# Patient Record
Sex: FEMALE | Race: WHITE | NOT HISPANIC OR LATINO | URBAN - METROPOLITAN AREA
[De-identification: names, ages, dates, MRNs, and addresses within clinical notes are randomized per-mention and may not be internally consistent; named-entity substitution may affect disease eponyms.]

---

## 2020-01-10 ENCOUNTER — APPOINTMENT (OUTPATIENT)
Age: 55
Setting detail: DERMATOLOGY
End: 2020-01-10

## 2020-01-10 VITALS — RESPIRATION RATE: 18 BRPM | HEIGHT: 61 IN | WEIGHT: 181 LBS

## 2020-01-10 DIAGNOSIS — L30.9 DERMATITIS, UNSPECIFIED: ICD-10-CM

## 2020-01-10 PROCEDURE — OTHER MEDICATION COUNSELING: OTHER

## 2020-01-10 PROCEDURE — OTHER TREATMENT REGIMEN: OTHER

## 2020-01-10 PROCEDURE — OTHER PRESCRIPTION: OTHER

## 2020-01-10 PROCEDURE — 99202 OFFICE O/P NEW SF 15 MIN: CPT

## 2020-01-10 PROCEDURE — OTHER COUNSELING: OTHER

## 2020-01-10 PROCEDURE — OTHER MIPS QUALITY: OTHER

## 2020-01-10 RX ORDER — HYDROCORTISONE 25 MG/G
CREAM TOPICAL TWICE A DAY
Qty: 1 | Refills: 1 | Status: ERX | COMMUNITY
Start: 2020-01-10

## 2020-01-10 ASSESSMENT — LOCATION DETAILED DESCRIPTION DERM
LOCATION DETAILED: PREPUCE
LOCATION DETAILED: RIGHT LABIA MAJORA
LOCATION DETAILED: LEFT INGUINAL FOLD
LOCATION DETAILED: RIGHT INGUINAL FOLD

## 2020-01-10 ASSESSMENT — LOCATION ZONE DERM
LOCATION ZONE: VULVA
LOCATION ZONE: LEG

## 2020-01-10 ASSESSMENT — LOCATION SIMPLE DESCRIPTION DERM
LOCATION SIMPLE: LEFT INGUINAL FOLD
LOCATION SIMPLE: RIGHT INGUINAL FOLD
LOCATION SIMPLE: VULVA

## 2020-01-10 NOTE — HPI: RASH
What Type Of Note Output Would You Prefer (Optional)?: Bullet Format
How Severe Is Your Rash?: moderate
Is This A New Presentation, Or A Follow-Up?: Rash
Additional History: Patient states rash started 2 years ago after a procedure was done to check for bladder leaking and the surgical tape used peeled her skin off.

## 2020-01-10 NOTE — PROCEDURE: MEDICATION COUNSELING
Xelmelaniez Pregnancy And Lactation Text: This medication is Pregnancy Category D and is not considered safe during pregnancy.  The risk during breast feeding is also uncertain.

## 2020-01-10 NOTE — PROCEDURE: MEDICATION COUNSELING
Patient requested call back to discuss prescription for MetFORMIN ER (GLUMETZA) 1000 MG modified release 24 hr tablet. Patient stated that insurance company will not cover and has not been taking.    Please call to discuss and ok to leave detailed message.     Topical Sulfur Applications Pregnancy And Lactation Text: This medication is Pregnancy Category C and has an unknown safety profile during pregnancy. It is unknown if this topical medication is excreted in breast milk.

## 2020-01-10 NOTE — PROCEDURE: MEDICATION COUNSELING
Dr. Cormier Ivermectin Pregnancy And Lactation Text: This medication is Pregnancy Category C and it isn't known if it is safe during pregnancy. It is also excreted in breast milk.

## 2020-01-10 NOTE — PROCEDURE: TREATMENT REGIMEN
Detail Level: Zone
Otc Regimen: Moisturizing cream
Initiate Treatment: Hydrocortisone 2.5% cream, apply to affected areas of groin and vaginal area once a day x 1 week, stop x 1 week, repeat as needed
Plan: Patient states rash began after she was exposed to surgical tape in these areas. States that her primary care gave her a triamcinolone/nystatin combo that will temporarily clear the area, but it will recur with significant discomfort. Advised to treat with lower potency steroid on a more regular basis. \\nReturn to clinic in 6 weeks for re-evaluation.\\nIf controlled at follow-up, consider Protopic for maintenance. \\nMay consider biopsy if not improved.

## 2020-02-25 ENCOUNTER — APPOINTMENT (OUTPATIENT)
Age: 55
Setting detail: DERMATOLOGY
End: 2020-03-03

## 2020-02-25 VITALS — RESPIRATION RATE: 18 BRPM | HEIGHT: 61 IN | WEIGHT: 182 LBS

## 2020-02-25 DIAGNOSIS — L30.9 DERMATITIS, UNSPECIFIED: ICD-10-CM

## 2020-02-25 PROCEDURE — 99213 OFFICE O/P EST LOW 20 MIN: CPT

## 2020-02-25 PROCEDURE — OTHER COUNSELING: OTHER

## 2020-02-25 PROCEDURE — OTHER TREATMENT REGIMEN: OTHER

## 2020-02-25 ASSESSMENT — LOCATION ZONE DERM
LOCATION ZONE: VULVA
LOCATION ZONE: LEG

## 2020-02-25 ASSESSMENT — LOCATION DETAILED DESCRIPTION DERM
LOCATION DETAILED: RIGHT LABIA MAJORA
LOCATION DETAILED: LEFT INGUINAL FOLD
LOCATION DETAILED: PREPUCE
LOCATION DETAILED: RIGHT INGUINAL FOLD

## 2020-02-25 ASSESSMENT — LOCATION SIMPLE DESCRIPTION DERM
LOCATION SIMPLE: LEFT INGUINAL FOLD
LOCATION SIMPLE: VULVA
LOCATION SIMPLE: RIGHT INGUINAL FOLD

## 2020-02-25 NOTE — PROCEDURE: TREATMENT REGIMEN
Detail Level: Zone
Plan: Advised patient that I am concerned for possible steroid-dependence at this area. Area was initially treated with triamcinolone combo medication and has been treated off and on with topical steroids for two years. Advised patient that we will try a bland skin care routine and attempt to break the inflammatory cycle. Patient uses a pantiliner because elastic in her underwear irritates area; advised switch to silk underwear. \\nReturn to clinic in 3 months for re-evaluation.
Otc Regimen: EltaMD silver gel, apply to affected areas twice a day
Discontinue Regimen: Hydrocortisone cream

## 2020-05-28 ENCOUNTER — APPOINTMENT (OUTPATIENT)
Age: 55
Setting detail: DERMATOLOGY
End: 2020-05-28

## 2020-05-28 VITALS — WEIGHT: 62 LBS | TEMPERATURE: 98.7 F | HEIGHT: 72 IN | RESPIRATION RATE: 18 BRPM

## 2020-05-28 DIAGNOSIS — L30.9 DERMATITIS, UNSPECIFIED: ICD-10-CM

## 2020-05-28 PROCEDURE — OTHER BIOPSY BY PUNCH METHOD: OTHER

## 2020-05-28 PROCEDURE — 56605 BIOPSY OF VULVA/PERINEUM: CPT

## 2020-05-28 PROCEDURE — OTHER COUNSELING: OTHER

## 2020-05-28 ASSESSMENT — LOCATION DETAILED DESCRIPTION DERM: LOCATION DETAILED: MONS PUBIS

## 2020-05-28 ASSESSMENT — LOCATION ZONE DERM: LOCATION ZONE: VULVA

## 2020-05-28 ASSESSMENT — LOCATION SIMPLE DESCRIPTION DERM: LOCATION SIMPLE: GROIN

## 2020-05-28 NOTE — PROCEDURE: BIOPSY BY PUNCH METHOD
Anesthesia Volume In Cc (Will Not Render If 0): 0.5
Detail Level: Detailed
Bill For Surgical Tray: no
Suture Removal: 10 days
Additional Anesthesia Volume In Cc (Will Not Render If 0): 0
Dressing: bandage
Billing Type: Third-Party Bill
Hemostasis: None
Patient Will Remove Sutures At Home?: Yes
Epidermal Sutures: 4-0 Surgipro
Punch Size In Mm: 4
Biopsy Type: H and E
Anesthesia Type: 1% lidocaine with epinephrine
Information: Selecting Yes will display possible errors in your note based on the variables you have selected. This validation is only offered as a suggestion for you. PLEASE NOTE THAT THE VALIDATION TEXT WILL BE REMOVED WHEN YOU FINALIZE YOUR NOTE. IF YOU WANT TO FAX A PRELIMINARY NOTE YOU WILL NEED TO TOGGLE THIS TO 'NO' IF YOU DO NOT WANT IT IN YOUR FAXED NOTE.
Wound Care: Petrolatum

## 2020-05-28 NOTE — HPI: RASH
What Type Of Note Output Would You Prefer (Optional)?: Bullet Format
How Severe Is Your Rash?: moderate
Is This A New Presentation, Or A Follow-Up?: Follow Up Rash
Additional History: Patient has not used topical steroids since last visit. States rash is no better or worse.

## 2020-06-11 ENCOUNTER — RX ONLY (RX ONLY)
Age: 55
End: 2020-06-11

## 2020-06-11 RX ORDER — KETOCONAZOLE 20 MG/ML
SHAMPOO TOPICAL
Qty: 1 | Refills: 2 | Status: ERX | COMMUNITY
Start: 2020-06-11

## 2020-08-17 ENCOUNTER — APPOINTMENT (OUTPATIENT)
Age: 55
Setting detail: DERMATOLOGY
End: 2020-08-18

## 2020-08-17 VITALS — RESPIRATION RATE: 18 BRPM | HEIGHT: 61 IN | TEMPERATURE: 98.2 F | WEIGHT: 179 LBS

## 2020-08-17 DIAGNOSIS — L30.9 DERMATITIS, UNSPECIFIED: ICD-10-CM

## 2020-08-17 PROCEDURE — OTHER TREATMENT REGIMEN: OTHER

## 2020-08-17 PROCEDURE — OTHER COUNSELING: OTHER

## 2020-08-17 PROCEDURE — OTHER MEDICATION COUNSELING: OTHER

## 2020-08-17 PROCEDURE — OTHER PRESCRIPTION: OTHER

## 2020-08-17 PROCEDURE — 99213 OFFICE O/P EST LOW 20 MIN: CPT

## 2020-08-17 RX ORDER — HYDROCORTISONE 25 MG/G
CREAM TOPICAL TWICE A DAY
Qty: 1 | Refills: 3 | Status: ERX | COMMUNITY
Start: 2020-08-17

## 2020-08-17 RX ORDER — PIMECROLIMUS 10 MG/G
CREAM TOPICAL BID
Qty: 1 | Refills: 3 | Status: ERX | COMMUNITY
Start: 2020-08-17

## 2020-08-17 ASSESSMENT — LOCATION DETAILED DESCRIPTION DERM: LOCATION DETAILED: RIGHT SUPRAPUBIC SKIN

## 2020-08-17 ASSESSMENT — LOCATION SIMPLE DESCRIPTION DERM: LOCATION SIMPLE: GROIN

## 2020-08-17 ASSESSMENT — LOCATION ZONE DERM: LOCATION ZONE: TRUNK

## 2020-08-17 NOTE — PROCEDURE: MEDICATION COUNSELING
Infliximab Pregnancy And Lactation Text: This medication is Pregnancy Category B and is considered safe during pregnancy. It is unknown if this medication is excreted in breast milk. gradual onset

## 2020-08-17 NOTE — PROCEDURE: TREATMENT REGIMEN
Plan: Return to clinic in 6 weeks
Discontinue Regimen: Ketoconazole cream (patient states it burned)
Detail Level: Zone
Initiate Treatment: Elidel cream, apply to affected areas twice a day on off steroid week
Continue Regimen: Hydrocortisone 2.5% cream, apply to affected areas twice a day x 7 days, stop x 7 days, repeat

## 2020-09-28 ENCOUNTER — APPOINTMENT (OUTPATIENT)
Age: 55
Setting detail: DERMATOLOGY
End: 2020-09-28

## 2020-09-28 VITALS — WEIGHT: 183 LBS | HEIGHT: 61 IN | RESPIRATION RATE: 18 BRPM | TEMPERATURE: 98.6 F

## 2020-09-28 DIAGNOSIS — L23.1 ALLERGIC CONTACT DERMATITIS DUE TO ADHESIVES: ICD-10-CM

## 2020-09-28 PROCEDURE — OTHER TREATMENT REGIMEN: OTHER

## 2020-09-28 PROCEDURE — OTHER MEDICATION COUNSELING: OTHER

## 2020-09-28 PROCEDURE — OTHER PRESCRIPTION: OTHER

## 2020-09-28 PROCEDURE — 99212 OFFICE O/P EST SF 10 MIN: CPT

## 2020-09-28 PROCEDURE — OTHER COUNSELING: OTHER

## 2020-09-28 RX ORDER — HYDROCORTISONE 25 MG/G
CREAM TOPICAL TWICE A DAY
Qty: 1 | Refills: 3 | Status: CANCELLED
Stop reason: ENTERED-IN-ERROR

## 2020-09-28 RX ORDER — PIMECROLIMUS 10 MG/G
CREAM TOPICAL BID
Qty: 1 | Refills: 3 | Status: CANCELLED
Stop reason: ENTERED-IN-ERROR

## 2020-09-28 ASSESSMENT — LOCATION SIMPLE DESCRIPTION DERM: LOCATION SIMPLE: GENITALIA

## 2020-09-28 ASSESSMENT — LOCATION DETAILED DESCRIPTION DERM: LOCATION DETAILED: GENITALIA

## 2020-09-28 ASSESSMENT — LOCATION ZONE DERM: LOCATION ZONE: TRUNK

## 2020-09-28 NOTE — PROCEDURE: MEDICATION COUNSELING
former/No Cyclosporine Counseling:  I discussed with the patient the risks of cyclosporine including but not limited to hypertension, gingival hyperplasia,myelosuppression, immunosuppression, liver damage, kidney damage, neurotoxicity, lymphoma, and serious infections. The patient understands that monitoring is required including baseline blood pressure, CBC, CMP, lipid panel and uric acid, and then 1-2 times monthly CMP and blood pressure.

## 2020-09-28 NOTE — HPI: RASH
What Type Of Note Output Would You Prefer (Optional)?: Bullet Format
How Severe Is Your Rash?: moderate
Is This A New Presentation, Or A Follow-Up?: Follow Up Rash
Additional History: Patient states rash has been stable until she had to clean her pool 2 weeks ago, then started flaring again.

## 2020-09-28 NOTE — PROCEDURE: TREATMENT REGIMEN
Plan: Patient states elialan is helping improve symptoms. \\nReturn to clinic in 8 weeks.
Detail Level: Zone
Modify Regimen: Elidel cream, apply to affected areas twice a day for three weeks, stop for one week, start again\\nHydrocortisone 2.5% cream, apply to affected areas twice a day during elidel break week\\n

## 2020-11-09 ENCOUNTER — APPOINTMENT (OUTPATIENT)
Age: 55
Setting detail: DERMATOLOGY
End: 2020-11-10

## 2020-11-09 VITALS — TEMPERATURE: 98.1 F | WEIGHT: 180 LBS | HEIGHT: 61 IN | RESPIRATION RATE: 18 BRPM

## 2020-11-09 DIAGNOSIS — L23.89 ALLERGIC CONTACT DERMATITIS DUE TO OTHER AGENTS: ICD-10-CM

## 2020-11-09 PROCEDURE — OTHER MEDICATION COUNSELING: OTHER

## 2020-11-09 PROCEDURE — OTHER PRESCRIPTION MEDICATION MANAGEMENT: OTHER

## 2020-11-09 PROCEDURE — OTHER COUNSELING: OTHER

## 2020-11-09 PROCEDURE — 99213 OFFICE O/P EST LOW 20 MIN: CPT

## 2020-11-09 PROCEDURE — OTHER MIPS QUALITY: OTHER

## 2020-11-09 ASSESSMENT — LOCATION DETAILED DESCRIPTION DERM: LOCATION DETAILED: GENITALIA

## 2020-11-09 ASSESSMENT — LOCATION SIMPLE DESCRIPTION DERM: LOCATION SIMPLE: GENITALIA

## 2020-11-09 ASSESSMENT — LOCATION ZONE DERM: LOCATION ZONE: TRUNK

## 2020-11-09 NOTE — PROCEDURE: PRESCRIPTION MEDICATION MANAGEMENT
Detail Level: Zone
Render In Strict Bullet Format?: No
Continue Regimen: Elidel cream, apply to affected areas twice a day for three weeks, stop for one\\nweek, start again\\nHydrocortisone 2.5% cream, apply to affected areas twice a day during elidel break week
Plan: Return to clinic in 4 months\\n\\nPatient has had improvement. Symptoms can be aggravated by shaving, dampness, etc. Advised patient on lifestyle changes to minimize irritation. She states that her flares are improved with resumption of medication use. Advised patient that current level of control may be the best combination we can prescribe. Will continue to monitor. Plan to see every 4 months for observation. RTC sooner if flaring and not controlled with topicals.

## 2020-11-09 NOTE — HPI: RASH
What Type Of Note Output Would You Prefer (Optional)?: Bullet Format
How Severe Is Your Rash?: moderate
Is This A New Presentation, Or A Follow-Up?: Follow Up Rash
Additional History: at site of contact irritation from adhesive.

## 2021-03-09 ENCOUNTER — APPOINTMENT (OUTPATIENT)
Age: 56
Setting detail: DERMATOLOGY
End: 2021-03-10

## 2021-03-09 VITALS — RESPIRATION RATE: 18 BRPM | TEMPERATURE: 97.8 F | WEIGHT: 180 LBS | HEIGHT: 61 IN

## 2021-03-09 DIAGNOSIS — L23.89 ALLERGIC CONTACT DERMATITIS DUE TO OTHER AGENTS: ICD-10-CM

## 2021-03-09 PROCEDURE — OTHER PRESCRIPTION: OTHER

## 2021-03-09 PROCEDURE — OTHER PRESCRIPTION MEDICATION MANAGEMENT: OTHER

## 2021-03-09 PROCEDURE — OTHER MEDICATION COUNSELING: OTHER

## 2021-03-09 PROCEDURE — OTHER COUNSELING: OTHER

## 2021-03-09 PROCEDURE — 99213 OFFICE O/P EST LOW 20 MIN: CPT

## 2021-03-09 PROCEDURE — OTHER MIPS QUALITY: OTHER

## 2021-03-09 RX ORDER — CLOBETASOL PROPIONATE 0.5 MG/G
CREAM TOPICAL BID
Qty: 1 | Refills: 3 | Status: ERX | COMMUNITY
Start: 2021-03-09

## 2021-03-09 ASSESSMENT — LOCATION DETAILED DESCRIPTION DERM: LOCATION DETAILED: GENITALIA

## 2021-03-09 ASSESSMENT — LOCATION SIMPLE DESCRIPTION DERM: LOCATION SIMPLE: GENITALIA

## 2021-03-09 ASSESSMENT — LOCATION ZONE DERM: LOCATION ZONE: TRUNK

## 2021-04-20 ENCOUNTER — APPOINTMENT (OUTPATIENT)
Age: 56
Setting detail: DERMATOLOGY
End: 2021-04-20

## 2021-04-20 VITALS — HEIGHT: 61 IN | RESPIRATION RATE: 18 BRPM | TEMPERATURE: 98.4 F | WEIGHT: 189 LBS

## 2021-04-20 DIAGNOSIS — L23.89 ALLERGIC CONTACT DERMATITIS DUE TO OTHER AGENTS: ICD-10-CM

## 2021-04-20 PROCEDURE — OTHER COUNSELING: OTHER

## 2021-04-20 PROCEDURE — 99213 OFFICE O/P EST LOW 20 MIN: CPT

## 2021-04-20 PROCEDURE — OTHER MEDICATION COUNSELING: OTHER

## 2021-04-20 PROCEDURE — OTHER PRESCRIPTION MEDICATION MANAGEMENT: OTHER

## 2021-04-20 ASSESSMENT — LOCATION DETAILED DESCRIPTION DERM: LOCATION DETAILED: GENITALIA

## 2021-04-20 ASSESSMENT — LOCATION SIMPLE DESCRIPTION DERM: LOCATION SIMPLE: GENITALIA

## 2021-04-20 ASSESSMENT — LOCATION ZONE DERM: LOCATION ZONE: TRUNK

## 2021-04-20 NOTE — PROCEDURE: PRESCRIPTION MEDICATION MANAGEMENT
Detail Level: Zone
Discontinue Regimen: clobetasol 0.05 % topical cream, Apply twice daily to rash x3 days, repeat every other week. (Will use for moderate/severe flares)
Render In Strict Bullet Format?: No
Continue Regimen: Elidel cream, apply to affected areas twice a day for three weeks, stop for one week, start again\\nHydrocortisone 2.5% cream, apply to affected areas twice a day during elidel break week
Plan: Patient advised to try kegel exercises to help improve bladder control. She states that she is continually damp due to bladder leakage. I advised that rash will probably continue to flare if the area remains damp. Using high potency steroid for short period as flare control.  \\nReturn to clinic in 3 months for a follow up visit

## 2021-07-20 ENCOUNTER — APPOINTMENT (OUTPATIENT)
Dept: URBAN - METROPOLITAN AREA CLINIC 265 | Age: 56
Setting detail: DERMATOLOGY
End: 2021-07-20

## 2021-07-20 VITALS — WEIGHT: 180 LBS | RESPIRATION RATE: 18 BRPM | HEIGHT: 61 IN

## 2021-07-20 DIAGNOSIS — D18.0 HEMANGIOMA: ICD-10-CM

## 2021-07-20 DIAGNOSIS — D22 MELANOCYTIC NEVI: ICD-10-CM

## 2021-07-20 DIAGNOSIS — L81.4 OTHER MELANIN HYPERPIGMENTATION: ICD-10-CM

## 2021-07-20 DIAGNOSIS — D485 NEOPLASM OF UNCERTAIN BEHAVIOR OF SKIN: ICD-10-CM

## 2021-07-20 DIAGNOSIS — L23.89 ALLERGIC CONTACT DERMATITIS DUE TO OTHER AGENTS: ICD-10-CM

## 2021-07-20 DIAGNOSIS — L82.1 OTHER SEBORRHEIC KERATOSIS: ICD-10-CM

## 2021-07-20 DIAGNOSIS — Z71.89 OTHER SPECIFIED COUNSELING: ICD-10-CM

## 2021-07-20 PROBLEM — D22.5 MELANOCYTIC NEVI OF TRUNK: Status: ACTIVE | Noted: 2021-07-20

## 2021-07-20 PROBLEM — D48.5 NEOPLASM OF UNCERTAIN BEHAVIOR OF SKIN: Status: ACTIVE | Noted: 2021-07-20

## 2021-07-20 PROBLEM — D18.01 HEMANGIOMA OF SKIN AND SUBCUTANEOUS TISSUE: Status: ACTIVE | Noted: 2021-07-20

## 2021-07-20 PROCEDURE — 11102 TANGNTL BX SKIN SINGLE LES: CPT

## 2021-07-20 PROCEDURE — 11103 TANGNTL BX SKIN EA SEP/ADDL: CPT

## 2021-07-20 PROCEDURE — OTHER BIOPSY BY SHAVE METHOD: OTHER

## 2021-07-20 PROCEDURE — OTHER MEDICATION COUNSELING: OTHER

## 2021-07-20 PROCEDURE — OTHER COUNSELING: OTHER

## 2021-07-20 PROCEDURE — 99214 OFFICE O/P EST MOD 30 MIN: CPT | Mod: 25

## 2021-07-20 PROCEDURE — OTHER PRESCRIPTION MEDICATION MANAGEMENT: OTHER

## 2021-07-20 ASSESSMENT — LOCATION DETAILED DESCRIPTION DERM
LOCATION DETAILED: GENITALIA
LOCATION DETAILED: SUPERIOR LUMBAR SPINE
LOCATION DETAILED: PERIUMBILICAL SKIN
LOCATION DETAILED: EPIGASTRIC SKIN
LOCATION DETAILED: RIGHT KNEE
LOCATION DETAILED: LEFT ANTERIOR PROXIMAL THIGH
LOCATION DETAILED: INFERIOR THORACIC SPINE
LOCATION DETAILED: RIGHT LATERAL SUPERIOR CHEST

## 2021-07-20 ASSESSMENT — LOCATION SIMPLE DESCRIPTION DERM
LOCATION SIMPLE: CHEST
LOCATION SIMPLE: GENITALIA
LOCATION SIMPLE: LEFT THIGH
LOCATION SIMPLE: UPPER BACK
LOCATION SIMPLE: RIGHT KNEE
LOCATION SIMPLE: ABDOMEN
LOCATION SIMPLE: LOWER BACK

## 2021-07-20 ASSESSMENT — LOCATION ZONE DERM
LOCATION ZONE: LEG
LOCATION ZONE: TRUNK

## 2021-07-20 NOTE — PROCEDURE: MEDICATION COUNSELING
19-Oct-2018 14:00 Rituxan Pregnancy And Lactation Text: This medication is Pregnancy Category C and it isn't know if it is safe during pregnancy. It is unknown if this medication is excreted in breast milk but similar antibodies are known to be excreted.

## 2021-07-20 NOTE — PROCEDURE: PRESCRIPTION MEDICATION MANAGEMENT
Render In Strict Bullet Format?: No
Continue Regimen: Elidel cream, apply to affected areas twice a day for three weeks, stop for one week, start again\\nHydrocortisone 2.5% cream, apply to affected areas twice a day during elidel break week
Plan: Patient experienced recent flare when she got in a swimming pool. She has resumed treatment and is noting improvement. Discussed avoiding exposures. Continue treatment as prescribed.
Detail Level: Zone

## 2021-07-20 NOTE — PROCEDURE: BIOPSY BY SHAVE METHOD
Hide Second Anesthesia?: No
X Size Of Lesion In Cm: 0
Wound Care: Petrolatum
Type Of Destruction Used: Curettage
Cryotherapy Text: The wound bed was treated with cryotherapy after the biopsy was performed.
Notification Instructions: Patient will be notified of biopsy results. However, patient instructed to call the office if not contacted within 2 weeks.
Electrodesiccation Text: The wound bed was treated with electrodesiccation after the biopsy was performed.
Anesthesia Volume In Cc (Will Not Render If 0): 0.5
Anesthesia Type: 1% lidocaine with epinephrine
Depth Of Biopsy: dermis
Consent: Written consent was obtained and risks were reviewed including but not limited to scarring, infection, bleeding, scabbing, incomplete removal, nerve damage and allergy to anesthesia.
Curettage Text: The wound bed was treated with curettage after the biopsy was performed.
Detail Level: Detailed
Post-Care Instructions: I reviewed with the patient in detail post-care instructions. Patient is to keep the biopsy site dry overnight, and then apply bacitracin twice daily until healed. Patient may apply hydrogen peroxide soaks to remove any crusting.
Hemostasis: Drysol
Dressing: bandage
Biopsy Method: Dermablade
Silver Nitrate Text: The wound bed was treated with silver nitrate after the biopsy was performed.
Billing Type: Third-Party Bill
Information: Selecting Yes will display possible errors in your note based on the variables you have selected. This validation is only offered as a suggestion for you. PLEASE NOTE THAT THE VALIDATION TEXT WILL BE REMOVED WHEN YOU FINALIZE YOUR NOTE. IF YOU WANT TO FAX A PRELIMINARY NOTE YOU WILL NEED TO TOGGLE THIS TO 'NO' IF YOU DO NOT WANT IT IN YOUR FAXED NOTE.
Electrodesiccation And Curettage Text: The wound bed was treated with electrodesiccation and curettage after the biopsy was performed.
Biopsy Type: H and E
Was A Bandage Applied: Yes

## 2021-08-18 NOTE — PROCEDURE: MEDICATION COUNSELING
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  It is also excreted in breast milk but can be used safely for shorter doses.

## 2021-11-22 ENCOUNTER — APPOINTMENT (OUTPATIENT)
Dept: URBAN - METROPOLITAN AREA CLINIC 265 | Age: 56
Setting detail: DERMATOLOGY
End: 2021-11-22

## 2021-11-22 VITALS — WEIGHT: 180 LBS | RESPIRATION RATE: 18 BRPM | HEIGHT: 61 IN

## 2021-11-22 DIAGNOSIS — L23.89 ALLERGIC CONTACT DERMATITIS DUE TO OTHER AGENTS: ICD-10-CM

## 2021-11-22 PROCEDURE — OTHER MIPS QUALITY: OTHER

## 2021-11-22 PROCEDURE — OTHER PRESCRIPTION MEDICATION MANAGEMENT: OTHER

## 2021-11-22 PROCEDURE — 99213 OFFICE O/P EST LOW 20 MIN: CPT

## 2021-11-22 PROCEDURE — OTHER MEDICATION COUNSELING: OTHER

## 2021-11-22 PROCEDURE — OTHER COUNSELING: OTHER

## 2021-11-22 ASSESSMENT — LOCATION ZONE DERM: LOCATION ZONE: TRUNK

## 2021-11-22 ASSESSMENT — LOCATION SIMPLE DESCRIPTION DERM: LOCATION SIMPLE: GENITALIA

## 2021-11-22 ASSESSMENT — LOCATION DETAILED DESCRIPTION DERM: LOCATION DETAILED: GENITALIA

## 2021-11-22 NOTE — PROCEDURE: MIPS QUALITY
Detail Level: Detailed
Quality 130: Documentation Of Current Medications In The Medical Record: Current Medications Documented
Quality 110: Preventive Care And Screening: Influenza Immunization: Influenza Immunization previously received during influenza season
Quality 226: Preventive Care And Screening: Tobacco Use: Screening And Cessation Intervention: Tobacco Screening not Performed for Unknown Reasons
Quality 431: Preventive Care And Screening: Unhealthy Alcohol Use - Screening: Patient identified as an unhealthy alcohol user when screened for unhealthy alcohol use using a systematic screening method and received brief counseling

## 2021-11-22 NOTE — PROCEDURE: MEDICATION COUNSELING
3 = unable to speak (not related to language barrier) Topical Clindamycin Counseling: Patient counseled that this medication may cause skin irritation or allergic reactions.  In the event of skin irritation, the patient was advised to reduce the amount of the drug applied or use it less frequently.   The patient verbalized understanding of the proper use and possible adverse effects of clindamycin.  All of the patient's questions and concerns were addressed.

## 2021-11-22 NOTE — PROCEDURE: PRESCRIPTION MEDICATION MANAGEMENT
Plan: Return to clinic in 3 months for a follow up visit
Detail Level: Zone
Render In Strict Bullet Format?: No
Continue Regimen: Elidel cream, apply to affected areas twice a day during steroid off week. \\nHydrocortisone 2.5% cream, apply to affected areas twice a day x1 week

## 2021-11-22 NOTE — PROCEDURE: MEDICATION COUNSELING
Vaccine Information Statement(s) was given today. This has been reviewed, questions answered, and verbal consent given by Patient for injection(s) and administration of Influenza (Inactivated).    1. Does the patient have a moderate to severe fever?  No  2. Has the patient had a serious reaction to a flu shot before?   No  3. Has the patient ever had Guillian [place] Syndrome within 6 weeks of a previous flu shot?  No  4. Does the patient have a serious allergy to eggs?  No  5. Is the patient less that 6 months of age?  No    Patient is eligible to receive the vaccine based on all questions being answered as 'No'.        Patient tolerated without incident. See immunization grid for documentation.     Quinolones Pregnancy And Lactation Text: This medication is Pregnancy Category C and it isn't know if it is safe during pregnancy. It is also excreted in breast milk.

## 2022-02-22 ENCOUNTER — APPOINTMENT (OUTPATIENT)
Dept: URBAN - METROPOLITAN AREA CLINIC 265 | Age: 57
Setting detail: DERMATOLOGY
End: 2022-02-22

## 2022-02-22 VITALS — HEIGHT: 61 IN | WEIGHT: 180 LBS | RESPIRATION RATE: 18 BRPM

## 2022-02-22 DIAGNOSIS — L23.89 ALLERGIC CONTACT DERMATITIS DUE TO OTHER AGENTS: ICD-10-CM

## 2022-02-22 PROCEDURE — OTHER PRESCRIPTION: OTHER

## 2022-02-22 PROCEDURE — OTHER PRESCRIPTION MEDICATION MANAGEMENT: OTHER

## 2022-02-22 PROCEDURE — OTHER MEDICATION COUNSELING: OTHER

## 2022-02-22 PROCEDURE — OTHER MIPS QUALITY: OTHER

## 2022-02-22 PROCEDURE — 99213 OFFICE O/P EST LOW 20 MIN: CPT

## 2022-02-22 PROCEDURE — OTHER COUNSELING: OTHER

## 2022-02-22 RX ORDER — HYDROCORTISONE 25 MG/G
CREAM TOPICAL
Qty: 30 | Refills: 3 | Status: ERX | COMMUNITY
Start: 2022-02-22

## 2022-02-22 RX ORDER — PIMECROLIMUS 10 MG/G
CREAM TOPICAL
Qty: 100 | Refills: 3 | Status: ERX | COMMUNITY
Start: 2022-02-22

## 2022-02-22 ASSESSMENT — LOCATION SIMPLE DESCRIPTION DERM: LOCATION SIMPLE: GENITALIA

## 2022-02-22 ASSESSMENT — LOCATION DETAILED DESCRIPTION DERM: LOCATION DETAILED: GENITALIA

## 2022-02-22 ASSESSMENT — LOCATION ZONE DERM: LOCATION ZONE: TRUNK

## 2022-02-22 NOTE — PROCEDURE: MIPS QUALITY
Detail Level: Detailed
Quality 226: Preventive Care And Screening: Tobacco Use: Screening And Cessation Intervention: Tobacco Screening not Performed for Unknown Reasons
Quality 130: Documentation Of Current Medications In The Medical Record: Current Medications Documented
Quality 110: Preventive Care And Screening: Influenza Immunization: Influenza Immunization previously received during influenza season
Quality 431: Preventive Care And Screening: Unhealthy Alcohol Use - Screening: Patient identified as an unhealthy alcohol user when screened for unhealthy alcohol use using a systematic screening method and received brief counseling

## 2022-02-22 NOTE — PROCEDURE: PRESCRIPTION MEDICATION MANAGEMENT
Continue Regimen: Elidel cream, apply to affected areas twice a day during steroid off week. \\nHydrocortisone 2.5% cream, apply to affected areas twice a day x1 week
Plan: Return to clinic in  months for a follow up visit\\nPatient is significantly improved
Detail Level: Zone
Render In Strict Bullet Format?: No

## 2022-08-22 ENCOUNTER — APPOINTMENT (OUTPATIENT)
Dept: URBAN - METROPOLITAN AREA CLINIC 265 | Age: 57
Setting detail: DERMATOLOGY
End: 2022-08-22

## 2022-08-22 VITALS — RESPIRATION RATE: 16 BRPM | HEIGHT: 61 IN | WEIGHT: 180 LBS

## 2022-08-22 DIAGNOSIS — L23.1 ALLERGIC CONTACT DERMATITIS DUE TO ADHESIVES: ICD-10-CM

## 2022-08-22 PROCEDURE — OTHER COUNSELING: OTHER

## 2022-08-22 PROCEDURE — OTHER MIPS QUALITY: OTHER

## 2022-08-22 PROCEDURE — 99213 OFFICE O/P EST LOW 20 MIN: CPT

## 2022-08-22 PROCEDURE — OTHER PRESCRIPTION MEDICATION MANAGEMENT: OTHER

## 2022-08-22 ASSESSMENT — LOCATION DETAILED DESCRIPTION DERM: LOCATION DETAILED: GENITALIA

## 2022-08-22 ASSESSMENT — LOCATION SIMPLE DESCRIPTION DERM: LOCATION SIMPLE: GENITALIA

## 2022-08-22 ASSESSMENT — LOCATION ZONE DERM: LOCATION ZONE: TRUNK

## 2022-08-22 NOTE — PROCEDURE: PRESCRIPTION MEDICATION MANAGEMENT
Initiate Treatment: Cortisone 10 OTC,  in in place of hydrocortisone 2.5%
Plan: Return to clinic in 3 months for follow up
Discontinue Regimen: Hydrocortisone 2.5% topical (patient felt like it was thinning the skin in the area; discussed minimizing use)
Modify Regimen: Elidel cream, use BID x 2 weeks, then use cortisone 10
Detail Level: Simple
Render In Strict Bullet Format?: No

## 2022-12-12 ENCOUNTER — APPOINTMENT (OUTPATIENT)
Dept: URBAN - METROPOLITAN AREA CLINIC 265 | Age: 57
Setting detail: DERMATOLOGY
End: 2022-12-12

## 2022-12-12 VITALS — HEIGHT: 61 IN | WEIGHT: 180 LBS | RESPIRATION RATE: 18 BRPM

## 2022-12-12 DIAGNOSIS — L23.1 ALLERGIC CONTACT DERMATITIS DUE TO ADHESIVES: ICD-10-CM

## 2022-12-12 PROCEDURE — OTHER PRESCRIPTION MEDICATION MANAGEMENT: OTHER

## 2022-12-12 PROCEDURE — OTHER MIPS QUALITY: OTHER

## 2022-12-12 PROCEDURE — 99213 OFFICE O/P EST LOW 20 MIN: CPT

## 2022-12-12 PROCEDURE — OTHER COUNSELING: OTHER

## 2022-12-12 ASSESSMENT — LOCATION DETAILED DESCRIPTION DERM: LOCATION DETAILED: GENITALIA

## 2022-12-12 ASSESSMENT — LOCATION SIMPLE DESCRIPTION DERM: LOCATION SIMPLE: GENITALIA

## 2022-12-12 ASSESSMENT — LOCATION ZONE DERM: LOCATION ZONE: TRUNK

## 2022-12-12 NOTE — PROCEDURE: PRESCRIPTION MEDICATION MANAGEMENT
Render In Strict Bullet Format?: No
Continue Regimen: Cortisone 10 OTC, BID x 1 week \\nElidel cream, use BID x 3 weeks\\n\\nrepeat cycle
Plan: Return to clinic in 6 months for follow up\\n\\npatient has been controlled on daily elidel. advised to take a week break monthly to avoid acquiring a tolerance.
Detail Level: Simple

## 2022-12-12 NOTE — HPI: RASH (ALLERGIC CONTACT DERMATITIS)
Group Therapy Documentation    PATIENT'S NAME: Flower Logan  MRN:   [id number]  :   1991  ACCT. NUMBER: [id number]  DATE OF SERVICE: 20  START TIME:  3:00 PM  END TIME:  4:00 PM  FACILITATOR(S): Fe Connell LADC; Kaleb Ross LADC; Dimple Rivas LADC  TOPIC: BEH Group Therapy  Number of patients attending the group: 20  Group Length:  1 Hours    Group Therapy Type: Recovery strategies    Summary of Group / Topics Discussed:    Sober coping skills, Co-occurring illnesses symptom management, and Disease of addiction      Group Attendance:  Attended group session    Patient's response to the group topic/interactions:  cooperative with task    Patient appeared to be Attentive and Engaged.        Client specific details: Flower was an active participant in Skills Group led by gambling counselor Whit Sellers.    
How Severe Is Your Rash?: moderate
Is This A New Presentation, Or A Follow-Up?: Follow Up Allergic Contact Dermatitis

## 2023-03-18 NOTE — PROCEDURE: MIPS QUALITY
Gastroenterology Consultation Note    Reason for Consult:  The primary encounter diagnosis was Shortness of breath. Diagnoses of Abdominal pain, Lower GI bleeding, and Anticoagulated were also pertinent to this visit.    PCP:   Jamaal Whitney MD    Referring MD:  Jamaal Whitney Md  [address] [place] [address]    Hospital Day: 0     Initial History of Present Illness (HPI):  This is a 64 y.o. female, with no prior GI workup, present ed to the ED after becoming constipated and straining to use the restroom. Pt has had very large hemorrhoids for years and they often become irritated with her straining. Pt is on chronic pain medication that causes her constipation. Pt also with past medical history of paroxysmal atrial fibrillation on Eliquis, history of DVT/PE, Goodpasture syndrome and COPD on chronic home oxygen, chronic kidney disease. Pt with no episodes of bleeding since admit.    No prior colonoscopy    ROS:  Review of Systems   Constitutional:  Negative for activity change, chills, diaphoresis, fatigue and fever.   HENT:  Negative for congestion, ear pain, sinus pain and sore throat.    Eyes:  Negative for visual disturbance.   Respiratory:  Negative for cough, shortness of breath, wheezing and stridor.    Cardiovascular:  Negative for chest pain, palpitations and leg swelling.   Gastrointestinal:  Positive for abdominal pain, anal bleeding and hematochezia. Negative for constipation, diarrhea, nausea, rectal pain and vomiting.   Genitourinary:  Negative for dysuria and hematuria.   Musculoskeletal:  Negative for arthralgias, back pain and myalgias.   Skin:  Negative for rash.   Neurological:  Negative for dizziness, syncope, weakness, numbness and headaches.   All other systems reviewed and are negative.    Medical History:   Past Medical History:   Diagnosis Date    COPD (chronic obstructive pulmonary disease)     Paroxysmal atrial fibrillation     Tremor        Surgical History: 
  No past surgical history on file.    Family History:   Family History   Problem Relation Age of Onset    Stroke Mother     COPD Mother     Lung cancer Father    .     Social History:   Social History     Tobacco Use    Smoking status: Former     Types: Cigarettes     Quit date: 2020     Years since quitting: 3.2     Passive exposure: Current    Smokeless tobacco: Never   Substance Use Topics    Alcohol use: Never       Allergies:  Review of patient's allergies indicates:  No Known Allergies    Medications Prior to Admission   Medication Sig Dispense Refill Last Dose    acetaZOLAMIDE (DIAMOX) 250 MG tablet Take by mouth daily as needed.   3/16/2023    albuterol (PROVENTIL/VENTOLIN HFA) 90 mcg/actuation inhaler Inhale 2 puffs into the lungs every 6 (six) hours as needed.   3/16/2023    albuterol-ipratropium (DUO-NEB) 2.5 mg-0.5 mg/3 mL nebulizer solution Inhale 3 mLs into the lungs every 6 (six) hours as needed.   3/16/2023    ALPRAZolam (XANAX) 2 MG Tab Take 2 mg by mouth 2 (two) times daily.   3/16/2023    amLODIPine (NORVASC) 5 MG tablet Take 1 tablet by mouth once daily.       cyanocobalamin 1,000 mcg/mL injection every 14 (fourteen) days.   3/16/2023    FLUoxetine 40 MG capsule Take 80 mg by mouth.   3/16/2023    folic acid (FOLVITE) 1 MG tablet Take 1,000 mcg by mouth once daily.   3/16/2023    furosemide (LASIX) 20 MG tablet Take 20 mg by mouth daily as needed.   Past Week    HYDROcodone-acetaminophen (NORCO)  mg per tablet Take 1 tablet by mouth 2 (two) times daily as needed.   3/16/2023    mirtazapine (REMERON) 30 MG tablet Take 30 mg by mouth every evening.   3/16/2023    oxybutynin (DITROPAN) 5 MG Tab Take 5 mg by mouth once daily.   3/16/2023    pantoprazole (PROTONIX) 40 MG tablet Take 40 mg by mouth once daily.   3/16/2023    primidone (MYSOLINE) 50 MG Tab Take 1 tablet (50 mg total) by mouth every evening. 30 tablet 11 3/16/2023    rivaroxaban (XARELTO) 20 mg Tab Take 1 tablet (20 mg total) by 
"mouth daily with dinner or evening meal. 30 tablet 0          Objective Findings:    Vital Signs:  BP (!) 153/76   Pulse 81   Temp 97.8 °F (36.6 °C) (Oral)   Resp 20   Ht 5' 1" (1.549 m)   Wt 88 kg (194 lb)   SpO2 (!) 94%   Breastfeeding No   BMI 36.66 kg/m²   Body mass index is 36.66 kg/m².    Physical Exam:     Nursing note and vitals reviewed.  Constitutional: No distress.   HENT:   Head: Normocephalic and atraumatic.   Eyes: EOM are normal.   Neck: Trachea normal. Neck supple.   Normal range of motion.  Cardiovascular:  Regular rhythm.           No murmur heard.  Mild tachycardia   Pulmonary/Chest: Breath sounds normal. No respiratory distress. She has no wheezes. She has no rales.   Abdominal: Abdomen is soft. Bowel sounds are normal. She exhibits no distension. There is abdominal tenderness.   Mild generalized abdominal tenderness to palpation. Ventral wall hernia present to LLQ.  There is no rebound and no guarding.   Genitourinary:    Genitourinary Comments:    Musculoskeletal:         General: Normal range of motion.      Cervical back: Normal range of motion and neck supple.      Lumbar back: Normal range of motion.    Neurological: She is alert and oriented to person, place, and time. She has normal strength.   Skin: Skin is warm and dry. No rash noted.   Psychiatric: She has a normal mood and affect.     Labs:  Recent Results (from the past 48 hour(s))   Comprehensive metabolic panel    Collection Time: 03/17/23  4:45 PM   Result Value Ref Range    Sodium Level 138 136 - 145 mmol/L    Potassium Level 5.4 (H) 3.5 - 5.1 mmol/L    Chloride 105 98 - 107 mmol/L    Carbon Dioxide 25 23 - 31 mmol/L    Glucose Level 92 82 - 115 mg/dL    Blood Urea Nitrogen 18.0 9.8 - 20.1 mg/dL    Creatinine 1.22 (H) 0.55 - 1.02 mg/dL    Calcium Level Total 9.8 8.4 - 10.2 mg/dL    Protein Total 7.5 5.8 - 7.6 gm/dL    Albumin Level 3.8 3.4 - 4.8 g/dL    Globulin 3.7 (H) 2.4 - 3.5 gm/dL    Albumin/Globulin Ratio 1.0 (L) "
1.1 - 2.0 ratio    Bilirubin Total 0.3 <=1.5 mg/dL    Alkaline Phosphatase 143 40 - 150 unit/L    Alanine Aminotransferase 30 0 - 55 unit/L    Aspartate Aminotransferase 30 5 - 34 unit/L    eGFR 50 mls/min/1.73/m2   APTT    Collection Time: 03/17/23  4:45 PM   Result Value Ref Range    PTT 25.8 23.2 - 33.7 seconds   Protime-INR    Collection Time: 03/17/23  4:45 PM   Result Value Ref Range    PT 12.7 12.5 - 14.5 seconds    INR 0.96 0.00 - 1.30   CBC with Differential    Collection Time: 03/17/23  4:45 PM   Result Value Ref Range    WBC 12.9 (H) 4.5 - 11.5 x10(3)/mcL    RBC 4.94 4.20 - 5.40 x10(6)/mcL    Hgb 14.1 12.0 - 16.0 g/dL    Hct 46.0 37.0 - 47.0 %    MCV 93.1 80.0 - 94.0 fL    MCH 28.5 pg    MCHC 30.7 (L) 33.0 - 36.0 g/dL    RDW 16.3 11.5 - 17.0 %    Platelet 290 130 - 400 x10(3)/mcL    MPV 10.4 7.4 - 10.4 fL    Neut % 60.9 %    Lymph % 27.0 %    Mono % 5.2 %    Eos % 4.0 %    Basophil % 1.0 %    Lymph # 3.47 0.6 - 4.6 x10(3)/mcL    Neut # 7.83 2.1 - 9.2 x10(3)/mcL    Mono # 0.67 0.1 - 1.3 x10(3)/mcL    Eos # 0.52 0 - 0.9 x10(3)/mcL    Baso # 0.13 0 - 0.2 x10(3)/mcL    IG# 0.25 (H) 0 - 0.04 x10(3)/mcL    IG% 1.9 %    NRBC% 0.0 %   Comprehensive metabolic panel    Collection Time: 03/18/23  4:57 AM   Result Value Ref Range    Sodium Level 137 136 - 145 mmol/L    Potassium Level 4.8 3.5 - 5.1 mmol/L    Chloride 106 98 - 107 mmol/L    Carbon Dioxide 22 (L) 23 - 31 mmol/L    Glucose Level 117 (H) 82 - 115 mg/dL    Blood Urea Nitrogen 16.2 9.8 - 20.1 mg/dL    Creatinine 1.08 (H) 0.55 - 1.02 mg/dL    Calcium Level Total 9.5 8.4 - 10.2 mg/dL    Protein Total 6.9 5.8 - 7.6 gm/dL    Albumin Level 3.5 3.4 - 4.8 g/dL    Globulin 3.4 2.4 - 3.5 gm/dL    Albumin/Globulin Ratio 1.0 (L) 1.1 - 2.0 ratio    Bilirubin Total 0.3 <=1.5 mg/dL    Alkaline Phosphatase 128 40 - 150 unit/L    Alanine Aminotransferase 25 0 - 55 unit/L    Aspartate Aminotransferase 24 5 - 34 unit/L    eGFR 58 mls/min/1.73/m2   CBC with Differential    
Collection Time: 03/18/23  4:57 AM   Result Value Ref Range    WBC 11.4 4.5 - 11.5 x10(3)/mcL    RBC 4.68 4.20 - 5.40 x10(6)/mcL    Hgb 13.2 12.0 - 16.0 g/dL    Hct 44.1 37.0 - 47.0 %    MCV 94.2 (H) 80.0 - 94.0 fL    MCH 28.2 pg    MCHC 29.9 (L) 33.0 - 36.0 g/dL    RDW 16.5 11.5 - 17.0 %    Platelet 234 130 - 400 x10(3)/mcL    MPV 9.8 7.4 - 10.4 fL    Neut % 86.0 %    Lymph % 8.0 %    Mono % 1.6 %    Eos % 2.1 %    Basophil % 0.6 %    Lymph # 0.92 0.6 - 4.6 x10(3)/mcL    Neut # 9.82 (H) 2.1 - 9.2 x10(3)/mcL    Mono # 0.18 0.1 - 1.3 x10(3)/mcL    Eos # 0.24 0 - 0.9 x10(3)/mcL    Baso # 0.07 0 - 0.2 x10(3)/mcL    IG# 0.20 (H) 0 - 0.04 x10(3)/mcL    IG% 1.7 %    NRBC% 0.0 %       X-Ray Abdomen AP 1 View (KUB)   Final Result      No acute abdominal radiographic abnormality.         Electronically signed by: Kellen Gray   Date:    03/17/2023   Time:    17:43          Imaging:  reviewed    Endoscopy:    none    Assessment/Plan:  BRBPR  Pt with large hemorrhoids  Hgb stable  Opioid induced constipation  Labial Cancer  PAF  On eliquis    Pt does not want Colonoscopy  Pt has not, and does not plan to follow up with her Oncologist for her labial cancer - had one lesion removed and has several more.  Pt not interested in any treatments other than medication at this time    Pt states has an old living will, but will be updating top reflect her wishes.    Will respect patients decision -    Will place pt on Anusol suppositories for 7 days  Will need to be on Movantik for her Opioid induced constipation    No further GI recs - please call if needed    Thank you for allowing us to participate in the care of Andreea Thompson.    Mellissa Wright, ANP as scribe for Dr. Pa Greene   
Quality 226: Preventive Care And Screening: Tobacco Use: Screening And Cessation Intervention: Patient screened for tobacco use and is an ex/non-smoker
Detail Level: Detailed
Quality 128: Preventive Care And Screening: Body Mass Index (Bmi) Screening And Follow-Up Plan: BMI is documented above normal parameters and a follow-up plan is documented
Additional Notes: The patient will be referred back to the primary care physician for BMI management.

## 2023-04-14 RX ORDER — PIMECROLIMUS 10 MG/G
CREAM TOPICAL
Qty: 100 | Refills: 3 | Status: ERX

## 2023-06-12 ENCOUNTER — APPOINTMENT (OUTPATIENT)
Dept: URBAN - METROPOLITAN AREA CLINIC 298 | Age: 58
Setting detail: DERMATOLOGY
End: 2023-06-12

## 2023-06-12 VITALS — HEIGHT: 55 IN | WEIGHT: 180 LBS | RESPIRATION RATE: 18 BRPM

## 2023-06-12 DIAGNOSIS — L23.1 ALLERGIC CONTACT DERMATITIS DUE TO ADHESIVES: ICD-10-CM

## 2023-06-12 PROCEDURE — OTHER COUNSELING: OTHER

## 2023-06-12 PROCEDURE — OTHER MIPS QUALITY: OTHER

## 2023-06-12 PROCEDURE — OTHER PRESCRIPTION MEDICATION MANAGEMENT: OTHER

## 2023-06-12 PROCEDURE — 99213 OFFICE O/P EST LOW 20 MIN: CPT

## 2023-06-12 ASSESSMENT — LOCATION DETAILED DESCRIPTION DERM: LOCATION DETAILED: MONS PUBIS

## 2023-06-12 ASSESSMENT — LOCATION SIMPLE DESCRIPTION DERM: LOCATION SIMPLE: GROIN

## 2023-06-12 ASSESSMENT — LOCATION ZONE DERM: LOCATION ZONE: VULVA

## 2023-06-12 NOTE — PROCEDURE: PRESCRIPTION MEDICATION MANAGEMENT
Continue Regimen: Elidel cream, apply 2x daily. Moisturize after. \\nCortisone 10, apply 2x daily. Moisturize after.
Detail Level: Zone
Render In Strict Bullet Format?: No
Plan: RTC 6 months. \\nPatient stated that rash comes and goes. She said that she only uses the Elidel 1x daily and does still take breaks on it.

## 2024-01-02 ENCOUNTER — APPOINTMENT (OUTPATIENT)
Dept: URBAN - METROPOLITAN AREA CLINIC 298 | Age: 59
Setting detail: DERMATOLOGY
End: 2024-01-02

## 2024-01-02 VITALS — RESPIRATION RATE: 18 BRPM | HEIGHT: 61 IN | WEIGHT: 180 LBS

## 2024-01-02 DIAGNOSIS — L23.1 ALLERGIC CONTACT DERMATITIS DUE TO ADHESIVES: ICD-10-CM

## 2024-01-02 PROCEDURE — OTHER COUNSELING: OTHER

## 2024-01-02 PROCEDURE — OTHER PRESCRIPTION MEDICATION MANAGEMENT: OTHER

## 2024-01-02 PROCEDURE — OTHER MIPS QUALITY: OTHER

## 2024-01-02 PROCEDURE — 99213 OFFICE O/P EST LOW 20 MIN: CPT

## 2024-01-02 ASSESSMENT — LOCATION ZONE DERM: LOCATION ZONE: VULVA

## 2024-01-02 ASSESSMENT — LOCATION SIMPLE DESCRIPTION DERM: LOCATION SIMPLE: GROIN

## 2024-01-02 ASSESSMENT — LOCATION DETAILED DESCRIPTION DERM: LOCATION DETAILED: MONS PUBIS

## 2024-01-02 NOTE — PROCEDURE: PRESCRIPTION MEDICATION MANAGEMENT
Detail Level: Zone
Render In Strict Bullet Format?: No
Plan: Return to clinic in 6 months for full skin exam.\\nPatient has had significant improvement in symptoms since taking a collagen supplement.
Discontinue Regimen: Elidel cream, apply 2x daily. Moisturize after. \\nCortisone 10, apply 2x daily. Moisturize after.

## 2024-01-02 NOTE — HPI: RASH
What Type Of Note Output Would You Prefer (Optional)?: Bullet Format
Is The Patient Presenting As Previously Scheduled?: Yes
How Severe Is Your Rash?: moderate
Is This A New Presentation, Or A Follow-Up?: Follow Up Rash
Additional History: Patient has not been using Elidel nor Cortisone for over a month, starting taking collagen for hair and nails growth and stated that rash is cleared. Clear on exam.